# Patient Record
Sex: MALE | Race: WHITE | NOT HISPANIC OR LATINO | URBAN - NONMETROPOLITAN AREA
[De-identification: names, ages, dates, MRNs, and addresses within clinical notes are randomized per-mention and may not be internally consistent; named-entity substitution may affect disease eponyms.]

---

## 2021-01-05 ENCOUNTER — IMPORTED ENCOUNTER (OUTPATIENT)
Dept: URBAN - NONMETROPOLITAN AREA CLINIC 1 | Facility: CLINIC | Age: 41
End: 2021-01-05

## 2021-01-05 PROBLEM — H52.223: Noted: 2021-01-05

## 2021-01-05 PROCEDURE — 92004 COMPRE OPH EXAM NEW PT 1/>: CPT

## 2021-01-05 PROCEDURE — 92015 DETERMINE REFRACTIVE STATE: CPT

## 2021-01-05 NOTE — PATIENT DISCUSSION
Astigmatism OUDiscussed refractive status in detail with patient. New glasses Rx given today. Continue to monitor.

## 2022-04-09 ASSESSMENT — TONOMETRY
OD_IOP_MMHG: 16
OS_IOP_MMHG: 16

## 2022-04-09 ASSESSMENT — VISUAL ACUITY
OD_CC: 20/40+
OS_CC: 20/30